# Patient Record
Sex: MALE | Race: WHITE | NOT HISPANIC OR LATINO | Employment: FULL TIME | URBAN - METROPOLITAN AREA
[De-identification: names, ages, dates, MRNs, and addresses within clinical notes are randomized per-mention and may not be internally consistent; named-entity substitution may affect disease eponyms.]

---

## 2017-03-27 ENCOUNTER — ALLSCRIPTS OFFICE VISIT (OUTPATIENT)
Dept: OTHER | Facility: OTHER | Age: 42
End: 2017-03-27

## 2018-01-13 VITALS
HEIGHT: 61 IN | SYSTOLIC BLOOD PRESSURE: 124 MMHG | BODY MASS INDEX: 27.38 KG/M2 | DIASTOLIC BLOOD PRESSURE: 74 MMHG | HEART RATE: 74 BPM | TEMPERATURE: 98.5 F | RESPIRATION RATE: 14 BRPM | WEIGHT: 145 LBS

## 2021-02-14 ENCOUNTER — OFFICE VISIT (OUTPATIENT)
Dept: URGENT CARE | Age: 46
End: 2021-02-14
Payer: COMMERCIAL

## 2021-02-14 VITALS — TEMPERATURE: 97.1 F | OXYGEN SATURATION: 99 % | HEART RATE: 72 BPM | RESPIRATION RATE: 18 BRPM

## 2021-02-14 DIAGNOSIS — Z20.822 ENCOUNTER FOR SCREENING LABORATORY TESTING FOR COVID-19 VIRUS: Primary | ICD-10-CM

## 2021-02-14 PROCEDURE — U0003 INFECTIOUS AGENT DETECTION BY NUCLEIC ACID (DNA OR RNA); SEVERE ACUTE RESPIRATORY SYNDROME CORONAVIRUS 2 (SARS-COV-2) (CORONAVIRUS DISEASE [COVID-19]), AMPLIFIED PROBE TECHNIQUE, MAKING USE OF HIGH THROUGHPUT TECHNOLOGIES AS DESCRIBED BY CMS-2020-01-R: HCPCS | Performed by: NURSE PRACTITIONER

## 2021-02-14 PROCEDURE — S9083 URGENT CARE CENTER GLOBAL: HCPCS | Performed by: NURSE PRACTITIONER

## 2021-02-14 PROCEDURE — U0005 INFEC AGEN DETEC AMPLI PROBE: HCPCS | Performed by: NURSE PRACTITIONER

## 2021-02-14 PROCEDURE — G0382 LEV 3 HOSP TYPE B ED VISIT: HCPCS | Performed by: NURSE PRACTITIONER

## 2021-02-14 NOTE — PROGRESS NOTES
[address]        NAME: Elizabeth Galvez is a 50 y o  male  : 1975    MRN: [id number]  DATE: 2021  TIME: 2:35 PM    Assessment and Plan   Encounter for screening laboratory testing for COVID-19 virus [Z20 [id number]]  1  Encounter for screening laboratory testing for COVID-19 virus  Novel Coronavirus (Covid-19),PCR [facility] - Office Collection         Patient Instructions     Tylenol OTC for headache  Robitussin OTC for cough  Covid tested; results in 2-3 days via MyChart  Stay quarantined  Follow up with PCP in 3-5 days  Proceed to  ER if symptoms worsen  Chief Complaint     Chief Complaint   Patient presents with    Cough     head ache and sore throat         History of Present Illness       HPI   Reports one coworker and his boss tested positive for covid  He reports frontal headache, mild intermittent cough and mild sore throat  Review of Systems   Review of Systems   Constitutional: Negative for chills and fever  HENT: Positive for sore throat  Negative for rhinorrhea and trouble swallowing  Respiratory: Positive for cough  Negative for chest tightness, shortness of breath and wheezing  Cardiovascular: Negative for chest pain  Gastrointestinal: Negative for diarrhea and vomiting  Neurological: Positive for headaches  Current Medications     No current outpatient medications on file  Current Allergies     Allergies as of 2021 - Reviewed 2021   Allergen Reaction Noted    Penicillins Nausea Only 2015    Sulfa antibiotics Hives 2015            The following portions of the patient's history were reviewed and updated as appropriate: allergies, current medications, past family history, past medical history, past social history, past surgical history and problem list      Past Medical History:   Diagnosis Date    Asthma        History reviewed  No pertinent surgical history  No family history on file        Medications have been verified  Objective   Pulse 72   Temp (!) 97 1 °F (36 2 °C)   Resp 18   SpO2 99%   No LMP for male patient  Physical Exam     Physical Exam  Constitutional:       Appearance: He is not ill-appearing or diaphoretic  HENT:      Nose: Congestion and rhinorrhea present  Cardiovascular:      Rate and Rhythm: Regular rhythm  Heart sounds: Normal heart sounds  Pulmonary:      Breath sounds: Normal breath sounds  Neurological:      Mental Status: He is alert

## 2021-02-15 LAB — SARS-COV-2 RNA RESP QL NAA+PROBE: NEGATIVE

## 2024-02-14 ENCOUNTER — OFFICE VISIT (OUTPATIENT)
Dept: FAMILY MEDICINE CLINIC | Facility: CLINIC | Age: 49
End: 2024-02-14
Payer: COMMERCIAL

## 2024-02-14 VITALS
SYSTOLIC BLOOD PRESSURE: 140 MMHG | TEMPERATURE: 97.6 F | DIASTOLIC BLOOD PRESSURE: 100 MMHG | HEIGHT: 61 IN | HEART RATE: 75 BPM | OXYGEN SATURATION: 99 % | BODY MASS INDEX: 41.69 KG/M2 | RESPIRATION RATE: 16 BRPM | WEIGHT: 220.8 LBS

## 2024-02-14 DIAGNOSIS — E78.5 HYPERLIPIDEMIA, UNSPECIFIED HYPERLIPIDEMIA TYPE: ICD-10-CM

## 2024-02-14 DIAGNOSIS — Z12.11 COLON CANCER SCREENING: ICD-10-CM

## 2024-02-14 DIAGNOSIS — R14.0 ABDOMINAL BLOATING: ICD-10-CM

## 2024-02-14 DIAGNOSIS — I10 ESSENTIAL HYPERTENSION: ICD-10-CM

## 2024-02-14 DIAGNOSIS — Z00.00 PERIODIC HEALTH ASSESSMENT, GENERAL SCREENING, ADULT: Primary | ICD-10-CM

## 2024-02-14 PROCEDURE — 99386 PREV VISIT NEW AGE 40-64: CPT | Performed by: FAMILY MEDICINE

## 2024-02-14 RX ORDER — LOSARTAN POTASSIUM 50 MG/1
50 TABLET ORAL DAILY
Qty: 30 TABLET | Refills: 5 | Status: SHIPPED | OUTPATIENT
Start: 2024-02-14

## 2024-02-14 NOTE — ASSESSMENT & PLAN NOTE
Well adult.  Overall the patient appears to be stable health.  He was given referral to [facility]'s colorectal department to have initial screening colonoscopy.  Patient will obtain blood work as ordered.

## 2024-02-14 NOTE — PROGRESS NOTES
FAMILY PRACTICE OFFICE VISIT       NAME: Manish Hernandez  AGE: 48 y.o. SEX: male       : 1975        MRN: [id number]    DATE: 2024  TIME: 12:54 PM    Assessment and Plan     Problem List Items Addressed This Visit       Essential hypertension     Hypertension.  Patient with elevated blood pressures.  He was given prescription to initiate losartan 50 mg 1 p.o. daily.  He will continue to work on diet and exercise to lose some weight.  We will recheck blood pressure in 6 months.         Relevant Medications    losartan (COZAAR) 50 mg tablet    Other Relevant Orders    Comprehensive metabolic panel    Lipid panel    TSH, 3rd generation    Hyperlipidemia    Periodic health assessment, general screening, adult - Primary     Well adult.  Overall the patient appears to be stable health.  He was given referral to [facility] colorectal department to have initial screening colonoscopy.  Patient will obtain blood work as ordered.         Relevant Orders    Comprehensive metabolic panel    Lipid panel    TSH, 3rd generation    Colon cancer screening    Relevant Orders    Ambulatory Referral to Colorectal Surgery     Other Visit Diagnoses       Abdominal bloating        Relevant Orders    Celiac Disease Panel                Chief Complaint     Chief Complaint   Patient presents with    Physical Exam    Establish Care       History of Present Illness     Patient in the office to reestablish new PCP and for well adult exam.  Patient has not seen a physician in over 5 years.  He denies any recent illness.  He does not take any medications on a regular basis.  Patient has gained approximately 80 pounds over the last 6 years due to dietary indiscretion and decreased physical activity.  He does have a father who passed away from colon cancer and hypertension.  Patient denies any chest pain or shortness of breath or headaches.    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  He is a non-smoker.        Review of  Systems   Review of Systems   Constitutional: Negative.    HENT: Negative.     Eyes: Negative.    Respiratory: Negative.     Cardiovascular: Negative.    Gastrointestinal:         As per HPI   Genitourinary: Negative.    Musculoskeletal: Negative.    Skin: Negative.    Neurological: Negative.    Psychiatric/Behavioral: Negative.         Active Problem List     Patient Active Problem List   Diagnosis    Essential hypertension    Hyperlipidemia    Periodic health assessment, general screening, adult    Colon cancer screening       Past Medical History:  Past Medical History:   Diagnosis Date    Asthma        Past Surgical History:  History reviewed. No pertinent surgical history.    Family History:  History reviewed. No pertinent family history.    Social History:  Social History     Socioeconomic History    Marital status: /Civil Union     Spouse name: Not on file    Number of children: Not on file    Years of education: Not on file    Highest education level: Not on file   Occupational History    Not on file   Tobacco Use    Smoking status: Never    Smokeless tobacco: Never   Substance and Sexual Activity    Alcohol use: Not Currently    Drug use: Never    Sexual activity: Not Currently   Other Topics Concern    Not on file   Social History Narrative    Not on file     Social Determinants of Health     Financial Resource Strain: Not on file   Food Insecurity: Not on file   Transportation Needs: Not on file   Physical Activity: Not on file   Stress: Not on file   Social Connections: Not on file   Intimate Partner Violence: Not on file   Housing Stability: Not on file       Objective     Vitals:    02/14/24 0919   BP: 140/100   Pulse:    Resp:    Temp:    SpO2:      Wt Readings from Last 3 Encounters:   02/14/24 100 kg (220 lb 12.8 oz)   03/27/17 65.8 kg (145 lb)   11/02/15 101 kg (222 lb)       Physical Exam  Constitutional:       General: He is not in acute distress.     Appearance: Normal appearance. He is  "not ill-appearing.   HENT:      Head: Normocephalic and atraumatic.   Eyes:      General:         Right eye: No discharge.         Left eye: No discharge.      Extraocular Movements: Extraocular movements intact.      Conjunctiva/sclera: Conjunctivae normal.      Pupils: Pupils are equal, round, and reactive to light.   Neck:      Vascular: No carotid bruit.   Cardiovascular:      Rate and Rhythm: Normal rate and regular rhythm.      Heart sounds: Normal heart sounds. No murmur heard.  Pulmonary:      Effort: Pulmonary effort is normal.      Breath sounds: Normal breath sounds. No wheezing, rhonchi or rales.   Abdominal:      General: Abdomen is flat. Bowel sounds are normal. There is no distension.      Palpations: Abdomen is soft.      Tenderness: There is no abdominal tenderness. There is no guarding or rebound.   Musculoskeletal:      Right lower leg: No edema.      Left lower leg: No edema.   Lymphadenopathy:      Cervical: No cervical adenopathy.   Skin:     Findings: No rash.   Neurological:      General: No focal deficit present.      Mental Status: He is alert and oriented to person, place, and time.      Cranial Nerves: No cranial nerve deficit.   Psychiatric:         Mood and Affect: Mood normal.         Behavior: Behavior normal.         Thought Content: Thought content normal.         Judgment: Judgment normal.         Pertinent Laboratory/Diagnostic Studies:  No results found for: \"GLUCOSE\", \"BUN\", \"CREATININE\", \"CALCIUM\", \"NA\", \"K\", \"CO2\", \"CL\"  No results found for: \"ALT\", \"AST\", \"GGT\", \"ALKPHOS\", \"BILITOT\"    No results found for: \"WBC\", \"HGB\", \"HCT\", \"MCV\", \"PLT\"    No results found for: \"TSH\"    No results found for: \"CHOL\"  No results found for: \"TRIG\"  No results found for: \"HDL\"  No results found for: \"LDLCALC\"  Lab Results   Component Value Date    HGBA1C 5.4 03/04/2013       Results for orders placed or performed in visit on 02/14/21   Novel Coronavirus (Covid-19),[facility] " Collection    Specimen: Nose; Nares   Result Value Ref Range    SARS-CoV-2 Negative Negative       Orders Placed This Encounter   Procedures    Comprehensive metabolic panel    Lipid panel    TSH, 3rd generation    Celiac Disease Panel    Ambulatory Referral to Colorectal Surgery       ALLERGIES:  Allergies   Allergen Reactions    Penicillins Nausea Only    Sulfa Antibiotics Hives       Current Medications     Current Outpatient Medications   Medication Sig Dispense Refill    losartan (COZAAR) 50 mg tablet Take 1 tablet (50 mg total) by mouth daily 30 tablet 5     No current facility-administered medications for this visit.         Health Maintenance     Health Maintenance   Topic Date Due    Hepatitis C Screening  Never done    HIV Screening  Never done    Annual Physical  Never done    DTaP,Tdap,and Td Vaccines (1 - Tdap) Never done    Colorectal Cancer Screening  Never done    Zoster Vaccine (1 of 2) Never done    Influenza Vaccine (1) Never done    COVID-19 Vaccine (3 - 2023-24 season) 09/01/2023    Depression Screening  02/14/2025    Pneumococcal Vaccine: Pediatrics (0 to 5 Years) and At-Risk Patients (6 to 64 Years)  Aged Out    HIB Vaccine  Aged Out    IPV Vaccine  Aged Out    Hepatitis A Vaccine  Aged Out    Meningococcal ACWY Vaccine  Aged Out    HPV Vaccine  Aged Out     Immunization History   Administered Date(s) Administered    COVID-19 J&J (Adin) vaccine 0.5 mL 04/02/2021    COVID-19 PFIZER VACCINE 0.3 ML IM 02/28/2022       Mango Bowens MD

## 2024-02-14 NOTE — ASSESSMENT & PLAN NOTE
Hypertension.  Patient with elevated blood pressures.  He was given prescription to initiate losartan 50 mg 1 p.o. daily.  He will continue to work on diet and exercise to lose some weight.  We will recheck blood pressure in 6 months.

## 2024-02-21 ENCOUNTER — APPOINTMENT (OUTPATIENT)
Dept: LAB | Facility: CLINIC | Age: 49
End: 2024-02-21
Payer: COMMERCIAL

## 2024-02-21 DIAGNOSIS — I10 ESSENTIAL HYPERTENSION: ICD-10-CM

## 2024-02-21 DIAGNOSIS — Z00.00 PERIODIC HEALTH ASSESSMENT, GENERAL SCREENING, ADULT: ICD-10-CM

## 2024-02-21 DIAGNOSIS — R14.0 ABDOMINAL BLOATING: ICD-10-CM

## 2024-02-21 PROBLEM — Z12.11 COLON CANCER SCREENING: Status: RESOLVED | Noted: 2024-02-14 | Resolved: 2024-02-21

## 2024-02-21 LAB — TSH SERPL DL<=0.05 MIU/L-ACNC: 2.49 UIU/ML (ref 0.45–4.5)

## 2024-02-21 PROCEDURE — 82784 ASSAY IGA/IGD/IGG/IGM EACH: CPT

## 2024-02-21 PROCEDURE — 86258 DGP ANTIBODY EACH IG CLASS: CPT

## 2024-02-21 PROCEDURE — 80053 COMPREHEN METABOLIC PANEL: CPT

## 2024-02-21 PROCEDURE — 80061 LIPID PANEL: CPT

## 2024-02-21 PROCEDURE — 86231 EMA EACH IG CLASS: CPT

## 2024-02-21 PROCEDURE — 36415 COLL VENOUS BLD VENIPUNCTURE: CPT

## 2024-02-21 PROCEDURE — 84443 ASSAY THYROID STIM HORMONE: CPT

## 2024-02-21 PROCEDURE — 86364 TISS TRNSGLTMNASE EA IG CLAS: CPT

## 2024-02-22 LAB
ALBUMIN SERPL BCP-MCNC: 4.2 G/DL (ref 3.5–5)
ALP SERPL-CCNC: 70 U/L (ref 34–104)
ALT SERPL W P-5'-P-CCNC: 23 U/L (ref 7–52)
ANION GAP SERPL CALCULATED.3IONS-SCNC: 8 MMOL/L
AST SERPL W P-5'-P-CCNC: 20 U/L (ref 13–39)
BILIRUB SERPL-MCNC: 0.55 MG/DL (ref 0.2–1)
BUN SERPL-MCNC: 15 MG/DL (ref 5–25)
CALCIUM SERPL-MCNC: 9.4 MG/DL (ref 8.4–10.2)
CHLORIDE SERPL-SCNC: 101 MMOL/L (ref 96–108)
CHOLEST SERPL-MCNC: 207 MG/DL
CO2 SERPL-SCNC: 30 MMOL/L (ref 21–32)
CREAT SERPL-MCNC: 0.78 MG/DL (ref 0.6–1.3)
ENDOMYSIUM IGA SER QL: NEGATIVE
GFR SERPL CREATININE-BSD FRML MDRD: 105 ML/MIN/1.73SQ M
GLIADIN PEPTIDE IGA SER-ACNC: 9 UNITS (ref 0–19)
GLIADIN PEPTIDE IGG SER-ACNC: 3 UNITS (ref 0–19)
GLUCOSE P FAST SERPL-MCNC: 96 MG/DL (ref 65–99)
HDLC SERPL-MCNC: 50 MG/DL
IGA SERPL-MCNC: 325 MG/DL (ref 90–386)
LDLC SERPL CALC-MCNC: 137 MG/DL (ref 0–100)
NONHDLC SERPL-MCNC: 157 MG/DL
POTASSIUM SERPL-SCNC: 4.4 MMOL/L (ref 3.5–5.3)
PROT SERPL-MCNC: 6.9 G/DL (ref 6.4–8.4)
SODIUM SERPL-SCNC: 139 MMOL/L (ref 135–147)
TRIGL SERPL-MCNC: 98 MG/DL
TTG IGA SER-ACNC: <2 U/ML (ref 0–3)
TTG IGG SER-ACNC: <2 U/ML (ref 0–5)

## 2024-07-12 DIAGNOSIS — I10 ESSENTIAL HYPERTENSION: ICD-10-CM

## 2024-07-12 RX ORDER — LOSARTAN POTASSIUM 50 MG/1
50 TABLET ORAL DAILY
Qty: 30 TABLET | Refills: 5 | Status: SHIPPED | OUTPATIENT
Start: 2024-07-12

## 2024-08-30 ENCOUNTER — OFFICE VISIT (OUTPATIENT)
Dept: FAMILY MEDICINE CLINIC | Facility: CLINIC | Age: 49
End: 2024-08-30
Payer: COMMERCIAL

## 2024-08-30 VITALS
BODY MASS INDEX: 40.02 KG/M2 | TEMPERATURE: 98 F | SYSTOLIC BLOOD PRESSURE: 138 MMHG | HEIGHT: 61 IN | WEIGHT: 212 LBS | HEART RATE: 79 BPM | RESPIRATION RATE: 16 BRPM | DIASTOLIC BLOOD PRESSURE: 88 MMHG | OXYGEN SATURATION: 99 %

## 2024-08-30 DIAGNOSIS — Z12.11 SCREENING FOR COLORECTAL CANCER: Primary | ICD-10-CM

## 2024-08-30 DIAGNOSIS — Z12.12 SCREENING FOR COLORECTAL CANCER: Primary | ICD-10-CM

## 2024-08-30 DIAGNOSIS — I10 ESSENTIAL HYPERTENSION: ICD-10-CM

## 2024-08-30 PROBLEM — Z00.00 WELL ADULT EXAM: Status: RESOLVED | Noted: 2024-08-30 | Resolved: 2024-08-30

## 2024-08-30 PROBLEM — Z00.00 WELL ADULT EXAM: Status: ACTIVE | Noted: 2024-08-30

## 2024-08-30 PROCEDURE — 99213 OFFICE O/P EST LOW 20 MIN: CPT | Performed by: FAMILY MEDICINE

## 2024-08-30 RX ORDER — MULTIVITAMIN
1 CAPSULE ORAL DAILY
COMMUNITY

## 2024-08-30 RX ORDER — LOSARTAN POTASSIUM 50 MG/1
50 TABLET ORAL DAILY
Qty: 90 TABLET | Refills: 3 | Status: SHIPPED | OUTPATIENT
Start: 2024-08-30

## 2024-08-30 NOTE — PROGRESS NOTES
FAMILY PRACTICE OFFICE VISIT       NAME: Manish Hernandez  AGE: 49 y.o. SEX: male       : 1975        MRN: [id number]    DATE: 2024  TIME: 12:39 PM    Assessment and Plan     Problem List Items Addressed This Visit       Essential hypertension     Hypertension.  The patient's blood pressure is stable at this time and he will continue current regimen of medications         Relevant Medications    losartan (COZAAR) 50 mg tablet    Screening for colorectal cancer - Primary     Screening for colon cancer.  Patient was given prescription to arrange colonoscopy with [facility] colorectal department for screening colonoscopy         Relevant Orders    Ambulatory Referral to Colorectal Surgery           Chief Complaint     Chief Complaint   Patient presents with    Hypertension       History of Present Illness     Patient in office to review chronic medical condition.  He denies any recent illness.  Patient denies any recent illness.  Patient does not obtain a regular form of aerobic exercise.    Hypertension  This is a chronic problem. The current episode started more than 1 month ago. The problem has been gradually improving since onset. The problem is controlled. There are no associated agents to hypertension. Risk factors for coronary artery disease include stress. There are no compliance problems.        Review of Systems   Review of Systems   Constitutional: Negative.    HENT: Negative.     Eyes: Negative.    Respiratory: Negative.     Cardiovascular: Negative.    Gastrointestinal: Negative.    Genitourinary: Negative.    Musculoskeletal: Negative.    Skin: Negative.    Neurological: Negative.    Psychiatric/Behavioral: Negative.         Active Problem List     Patient Active Problem List   Diagnosis    Essential hypertension    Hyperlipidemia    Screening for colorectal cancer       Past Medical History:  Past Medical History:   Diagnosis Date    Asthma        Past Surgical History:  History reviewed.  No pertinent surgical history.    Family History:  Family History   Problem Relation Age of Onset    Colon cancer Father        Social History:  Social History     Socioeconomic History    Marital status: /Civil Union     Spouse name: Not on file    Number of children: Not on file    Years of education: Not on file    Highest education level: Not on file   Occupational History    Not on file   Tobacco Use    Smoking status: Never    Smokeless tobacco: Never   Substance and Sexual Activity    Alcohol use: Not Currently    Drug use: Never    Sexual activity: Not Currently   Other Topics Concern    Not on file   Social History Narrative    Not on file     Social Determinants of Health     Financial Resource Strain: Not on file   Food Insecurity: Not on file   Transportation Needs: Not on file   Physical Activity: Not on file   Stress: Not on file   Social Connections: Not on file   Intimate Partner Violence: Not on file   Housing Stability: Not on file       Objective     Vitals:    08/30/24 1113   BP: 138/88   Pulse: 79   Resp: 16   Temp: 98 °F (36.7 °C)   SpO2: 99%     Wt Readings from Last 3 Encounters:   08/30/24 96.2 kg (212 lb)   02/14/24 100 kg (220 lb 12.8 oz)   03/27/17 65.8 kg (145 lb)       Physical Exam  Constitutional:       General: He is not in acute distress.     Appearance: Normal appearance. He is not ill-appearing.   HENT:      Head: Normocephalic and atraumatic.   Eyes:      General:         Right eye: No discharge.         Left eye: No discharge.      Extraocular Movements: Extraocular movements intact.      Conjunctiva/sclera: Conjunctivae normal.      Pupils: Pupils are equal, round, and reactive to light.   Neck:      Vascular: No carotid bruit.   Cardiovascular:      Rate and Rhythm: Normal rate and regular rhythm.      Heart sounds: Normal heart sounds. No murmur heard.  Pulmonary:      Effort: Pulmonary effort is normal.      Breath sounds: Normal breath sounds. No wheezing, rhonchi or  "rales.   Abdominal:      General: Abdomen is flat. Bowel sounds are normal. There is no distension.      Palpations: Abdomen is soft.      Tenderness: There is no abdominal tenderness. There is no guarding or rebound.   Musculoskeletal:      Right lower leg: No edema.      Left lower leg: No edema.   Lymphadenopathy:      Cervical: No cervical adenopathy.   Skin:     Findings: No rash.   Neurological:      General: No focal deficit present.      Mental Status: He is alert and oriented to person, place, and time.      Cranial Nerves: No cranial nerve deficit.   Psychiatric:         Mood and Affect: Mood normal.         Behavior: Behavior normal.         Thought Content: Thought content normal.         Judgment: Judgment normal.         Pertinent Laboratory/Diagnostic Studies:  Lab Results   Component Value Date    BUN 15 02/21/2024    CREATININE 0.78 02/21/2024    CALCIUM 9.4 02/21/2024    K 4.4 02/21/2024    CO2 30 02/21/2024     02/21/2024     Lab Results   Component Value Date    ALT 23 02/21/2024    AST 20 02/21/2024    ALKPHOS 70 02/21/2024       No results found for: \"WBC\", \"HGB\", \"HCT\", \"MCV\", \"PLT\"    No results found for: \"TSH\"    No results found for: \"CHOL\"  Lab Results   Component Value Date    TRIG 98 02/21/2024     Lab Results   Component Value Date    HDL 50 02/21/2024     Lab Results   Component Value Date    LDLCALC 137 (H) 02/21/2024     Lab Results   Component Value Date    HGBA1C 5.4 03/04/2013       Results for orders placed or performed in visit on 02/21/24   Comprehensive metabolic panel   Result Value Ref Range    Sodium 139 135 - 147 mmol/L    Potassium 4.4 3.5 - 5.3 mmol/L    Chloride 101 96 - 108 mmol/L    CO2 30 21 - 32 mmol/L    ANION GAP 8 mmol/L    BUN 15 5 - 25 mg/dL    Creatinine 0.78 0.60 - 1.30 mg/dL    Glucose, Fasting 96 65 - 99 mg/dL    Calcium 9.4 8.4 - 10.2 mg/dL    AST 20 13 - 39 U/L    ALT 23 7 - 52 U/L    Alkaline Phosphatase 70 34 - 104 U/L    Total Protein 6.9 6.4 - " 8.4 g/dL    Albumin 4.2 3.5 - 5.0 g/dL    Total Bilirubin 0.55 0.20 - 1.00 mg/dL    eGFR 105 ml/min/1.73sq m   Lipid panel   Result Value Ref Range    Cholesterol 207 (H) See Comment mg/dL    Triglycerides 98 See Comment mg/dL    HDL, Direct 50 >=40 mg/dL    LDL Calculated 137 (H) 0 - 100 mg/dL    Non-HDL-Chol (CHOL-HDL) 157 mg/dl   TSH, 3rd generation   Result Value Ref Range    TSH 3RD GENERATON 2.487 0.450 - 4.500 uIU/mL   Celiac Disease Antibody Profile   Result Value Ref Range    IgA 325 90 - 386 mg/dL    Gliadin IgA 9 0 - 19 units    Gliadin IgG 3 0 - 19 units    Tissue Transglut Ab IGG <2 0 - 5 U/mL    TISSUE TRANSGLUTAMINASE IGA <2 0 - 3 U/mL    Endomysial IgA Negative Negative       Orders Placed This Encounter   Procedures    Ambulatory Referral to Colorectal Surgery       ALLERGIES:  Allergies   Allergen Reactions    Penicillins Nausea Only    Sulfa Antibiotics Hives       Current Medications     Current Outpatient Medications   Medication Sig Dispense Refill    losartan (COZAAR) 50 mg tablet Take 1 tablet (50 mg total) by mouth daily 90 tablet 3    Multiple Vitamin (multivitamin) capsule Take 1 capsule by mouth daily       No current facility-administered medications for this visit.         Health Maintenance     Health Maintenance   Topic Date Due    Hepatitis C Screening  Never done    HIV Screening  Never done    DTaP,Tdap,and Td Vaccines (1 - Tdap) Never done    Colorectal Cancer Screening  Never done    Zoster Vaccine (1 of 2) Never done    COVID-19 Vaccine (3 - 2023-24 season) 09/01/2023    Influenza Vaccine (1) 09/01/2024    Annual Physical  02/14/2025    Depression Screening  08/30/2025    RSV Vaccine Age 60+ Years (1 - 1-dose 60+ series) 05/27/2033    RSV Vaccine age 0-20 Months  Aged Out    Pneumococcal Vaccine: Pediatrics (0 to 5 Years) and At-Risk Patients (6 to 64 Years)  Aged Out    HIB Vaccine  Aged Out    IPV Vaccine  Aged Out    Hepatitis A Vaccine  Aged Out    Meningococcal ACWY Vaccine   Aged Out    HPV Vaccine  Aged Out     Immunization History   Administered Date(s) Administered    COVID-19 J&J (TweepsMap) vaccine 0.5 mL 04/02/2021    COVID-19 PFIZER VACCINE 0.3 ML IM 02/28/2022       Mango Bowens MD

## 2024-08-30 NOTE — ASSESSMENT & PLAN NOTE
Screening for colon cancer.  Patient was given prescription to arrange colonoscopy with [facility] colorectal department for screening colonoscopy

## 2024-09-29 PROBLEM — Z12.12 SCREENING FOR COLORECTAL CANCER: Status: RESOLVED | Noted: 2024-08-30 | Resolved: 2024-09-29

## 2024-09-29 PROBLEM — Z12.11 SCREENING FOR COLORECTAL CANCER: Status: RESOLVED | Noted: 2024-08-30 | Resolved: 2024-09-29

## 2024-10-29 ENCOUNTER — OFFICE VISIT (OUTPATIENT)
Dept: FAMILY MEDICINE CLINIC | Facility: CLINIC | Age: 49
End: 2024-10-29
Payer: COMMERCIAL

## 2024-10-29 VITALS
RESPIRATION RATE: 16 BRPM | HEART RATE: 81 BPM | DIASTOLIC BLOOD PRESSURE: 86 MMHG | WEIGHT: 213.4 LBS | OXYGEN SATURATION: 98 % | HEIGHT: 61 IN | SYSTOLIC BLOOD PRESSURE: 136 MMHG | BODY MASS INDEX: 40.29 KG/M2 | TEMPERATURE: 98.2 F

## 2024-10-29 DIAGNOSIS — J06.9 UPPER RESPIRATORY TRACT INFECTION, UNSPECIFIED TYPE: Primary | ICD-10-CM

## 2024-10-29 PROCEDURE — 99213 OFFICE O/P EST LOW 20 MIN: CPT | Performed by: FAMILY MEDICINE

## 2024-10-29 NOTE — PROGRESS NOTES
"Ambulatory Visit  Name: Manish Hernandez      : 1975      MRN: [id number]  Encounter Provider: Katy Morton DO  Encounter Date: 10/29/2024   Encounter department: [facility]    Assessment & Plan  Upper respiratory tract infection, unspecified type  Symptoms overall improving with persistent cough. Discussed this is likely post viral cough and he is overall well appearing and improving in general. Recommend time for resolution. Can use symptom directed treatment as needed. Follow up if worsening or not improved.          History of Present Illness     Cough  This is a new problem. The current episode started 1 to 4 weeks ago. The problem has been unchanged. The problem occurs every few hours. The cough is Productive of sputum. Associated symptoms include nasal congestion. Nothing aggravates the symptoms.     Cough with scratchy throat started about two weeks ago, then had sinus pressure and congestion. Took Afrin, Mucinex DM, Sudafed, and Benadryl. Stopped Afrin after 3 days. Now using nasal saline, Mucinex DM, Sudafed. Overall symptoms are improved since onset. Coughs a lot first thing in the morning, green phlegm this morning. No fever at all during this illness. Feeling overall improved.        Review of Systems   Respiratory:  Positive for cough.            Objective     /86 (BP Location: Left arm, Patient Position: Sitting, Cuff Size: Large)   Pulse 81   Temp 98.2 °F (36.8 °C) (Temporal)   Resp 16   Ht 5' 1\" (1.549 m)   Wt 96.8 kg (213 lb 6.4 oz)   SpO2 98%   BMI 40.32 kg/m²     Physical Exam  Vitals reviewed.   Constitutional:       Appearance: Normal appearance.   HENT:      Right Ear: External ear normal.      Left Ear: External ear normal.      Nose: Nose normal.      Mouth/Throat:      Mouth: Mucous membranes are moist.      Pharynx: Oropharynx is clear.   Eyes:      Conjunctiva/sclera: Conjunctivae normal.   Cardiovascular:      Rate and Rhythm: Normal rate " and regular rhythm.      Heart sounds: Normal heart sounds.   Pulmonary:      Effort: Pulmonary effort is normal.      Breath sounds: Normal breath sounds.   Skin:     General: Skin is warm and dry.   Neurological:      Mental Status: He is alert.   Psychiatric:         Mood and Affect: Mood normal.         Behavior: Behavior normal.

## 2025-01-30 ENCOUNTER — PREP FOR PROCEDURE (OUTPATIENT)
Age: 50
End: 2025-01-30

## 2025-01-30 ENCOUNTER — TELEPHONE (OUTPATIENT)
Age: 50
End: 2025-01-30

## 2025-01-30 DIAGNOSIS — Z12.11 SCREENING FOR COLON CANCER: Primary | ICD-10-CM

## 2025-01-30 NOTE — TELEPHONE ENCOUNTER
Scheduled date of colonoscopy (as of today): 2/27/25  Physician performing colonoscopy:   Location of colonoscopy: [facility]  Bowel prep reviewed with patient: Miralax Dulcolax prep instructions reviewed and sent via [employer]  Instructions reviewed with patient by: md  Clearances:

## 2025-01-30 NOTE — TELEPHONE ENCOUNTER
Rescheduled    Scheduled date of colonoscopy (as of today): 3-4-2025   Physician performing colonoscopy:Dr. Tidwell   [facility]   Bowel prep reviewed with patient: miralax dulcolax   Instructions reviewed with patient by: M.D  Clearances:

## 2025-01-30 NOTE — TELEPHONE ENCOUNTER
01/30/25  Screened by: Barbie Torres MA    Referring Provider Mango Bowens    Pre- Screening:     BMI  40.32  Has patient been referred for a routine screening Colonoscopy? yes  Is the patient between 45-75 years old? yes      Previous Colonoscopy no   If yes:    Date:     Facility:     Reason:       Does the patient want to see a Gastroenterologist prior to their procedure OR are they having any GI symptoms? no    Has the patient been hospitalized or had abdominal surgery in the past 6 months? no    Does the patient use supplemental oxygen? no    Does the patient take Coumadin, Lovenox, Plavix, Elliquis, Xarelto, or other blood thinning medication? no    Has the patient had a stroke, cardiac event, or stent placed in the past year? no      If patient is between 45yrs - 49yrs, please advise patient that we will have to confirm benefits & coverage with their insurance company for a routine screening colonoscopy.

## 2025-01-30 NOTE — LETTER
January 30, 2025    Manish Hernandez  [address] [place] [id number]      Dear Mr. Hernandez:    Below are your prep instructions for your upcoming procedure on 2/27/25. If you have any questions or concerns please contact us at [phone].    Thank you,     [facility]